# Patient Record
Sex: FEMALE | Race: BLACK OR AFRICAN AMERICAN | URBAN - METROPOLITAN AREA
[De-identification: names, ages, dates, MRNs, and addresses within clinical notes are randomized per-mention and may not be internally consistent; named-entity substitution may affect disease eponyms.]

---

## 2020-10-05 ENCOUNTER — TELEPHONE ENCOUNTER (OUTPATIENT)
Dept: URBAN - METROPOLITAN AREA CLINIC 92 | Facility: CLINIC | Age: 65
End: 2020-10-05

## 2020-10-05 ENCOUNTER — OFFICE VISIT (OUTPATIENT)
Dept: URBAN - METROPOLITAN AREA TELEHEALTH 2 | Facility: TELEHEALTH | Age: 65
End: 2020-10-05

## 2020-10-05 RX ORDER — METOPROLOL SUCCINATE 100 MG/1
TABLET, EXTENDED RELEASE ORAL
Qty: 0 | Refills: 0 | Status: ACTIVE | COMMUNITY
Start: 1900-01-01 | End: 1900-01-01

## 2021-05-27 ENCOUNTER — WEB ENCOUNTER (OUTPATIENT)
Dept: URBAN - METROPOLITAN AREA CLINIC 84 | Facility: CLINIC | Age: 66
End: 2021-05-27

## 2021-05-27 ENCOUNTER — OFFICE VISIT (OUTPATIENT)
Dept: URBAN - METROPOLITAN AREA CLINIC 84 | Facility: CLINIC | Age: 66
End: 2021-05-27
Payer: COMMERCIAL

## 2021-05-27 DIAGNOSIS — K21.9 GERD: ICD-10-CM

## 2021-05-27 DIAGNOSIS — R18.8 OTHER ASCITES: ICD-10-CM

## 2021-05-27 DIAGNOSIS — K59.04 CHRONIC IDIOPATHIC CONSTIPATION: ICD-10-CM

## 2021-05-27 DIAGNOSIS — K74.69 OTHER CIRRHOSIS OF LIVER: ICD-10-CM

## 2021-05-27 PROCEDURE — 99213 OFFICE O/P EST LOW 20 MIN: CPT | Performed by: INTERNAL MEDICINE

## 2021-05-27 RX ORDER — FLUTICASONE PROPIONATE 50 UG/1
1 SPRAY IN EACH NOSTRIL SPRAY, METERED NASAL ONCE A DAY
Status: ACTIVE | COMMUNITY

## 2021-05-27 RX ORDER — CHOLECALCIFEROL (VITAMIN D3) 1250 MCG
1 CAPSULE CAPSULE ORAL
Status: ACTIVE | COMMUNITY

## 2021-05-27 RX ORDER — OMEPRAZOLE 40 MG/1
1 CAPSULE 30 MINUTES BEFORE MORNING MEAL CAPSULE, DELAYED RELEASE ORAL ONCE A DAY
Qty: 30 | Refills: 5 | OUTPATIENT
Start: 2021-05-27

## 2021-05-27 RX ORDER — METFORMIN HYDROCHLORIDE 500 MG/1
1 TABLET WITH A MEAL TABLET, FILM COATED ORAL ONCE A DAY
Status: ACTIVE | COMMUNITY

## 2021-05-27 RX ORDER — GABAPENTIN 300 MG/1
1 CAPSULE CAPSULE ORAL ONCE A DAY
Status: ACTIVE | COMMUNITY

## 2021-05-27 RX ORDER — VITAMIN A 2400 MCG
1 TABLET CAPSULE ORAL ONCE A DAY
Status: ACTIVE | COMMUNITY

## 2021-05-27 RX ORDER — HYDROCHLOROTHIAZIDE 50 MG/1
1 TABLET IN THE MORNING TABLET ORAL ONCE A DAY
Status: ACTIVE | COMMUNITY

## 2021-05-27 RX ORDER — LACTULOSE 10 G/15ML
30 ML SOLUTION ORAL
Qty: 1800 MILLILITER | Refills: 5 | OUTPATIENT
Start: 2021-05-27 | End: 2021-11-22

## 2021-05-27 RX ORDER — METOPROLOL SUCCINATE 100 MG/1
TABLET, EXTENDED RELEASE ORAL
Qty: 0 | Refills: 0 | Status: ACTIVE | COMMUNITY
Start: 1900-01-01

## 2021-05-27 NOTE — HPI-TODAY'S VISIT:
Patient last seen 4/2019.  Triple Phase CT Scan and labs thenw ere normal with undetectable HCV RNA.  Ther ehas been no change in her PMhx/PSHx.  She was in [place] over the past year during COVID.  She ahs a lot of abdominal bloat.  She has been having constipation.  She ahs firm stool with a lot of strain and incomeplte evacuation.  She is on iron.  She is not on narcotics.  SHe is on Gabapentin.  She denies LG bleed or melena.  She used some Lactulose with some relief.  She also used some OTC MOM.  She denies abdominal pain except some discomfrot from her bloat.  She has occasional LE edema.  She ahs GERD with regurgitation of her food.  This increases when she is supine.  She dneies N/V.  She does have occasional dysphagia.  She is due for repeat colonoscopy due to her history of colon polyps.  She has documented cirrhosis.  She ahs not ahd a recent US.  Labs from 5/20 show normal LFT's and CBC

## 2021-05-28 ENCOUNTER — TELEPHONE ENCOUNTER (OUTPATIENT)
Dept: URBAN - METROPOLITAN AREA CLINIC 96 | Facility: CLINIC | Age: 66
End: 2021-05-28

## 2021-06-03 ENCOUNTER — LAB OUTSIDE AN ENCOUNTER (OUTPATIENT)
Dept: URBAN - METROPOLITAN AREA CLINIC 84 | Facility: CLINIC | Age: 66
End: 2021-06-03

## 2021-06-06 PROBLEM — 19943007: Status: ACTIVE | Noted: 2021-05-27

## 2021-06-06 PROBLEM — 235595009 GASTROESOPHAGEAL REFLUX DISEASE: Status: ACTIVE | Noted: 2021-05-27

## 2021-06-06 PROBLEM — 82934008 CHRONIC IDIOPATHIC CONSTIPATION: Status: ACTIVE | Noted: 2021-05-27

## 2021-06-06 PROBLEM — 389026000: Status: ACTIVE | Noted: 2021-05-27

## 2021-06-24 ENCOUNTER — TELEPHONE ENCOUNTER (OUTPATIENT)
Dept: URBAN - METROPOLITAN AREA CLINIC 84 | Facility: CLINIC | Age: 66
End: 2021-06-24

## 2021-12-27 ENCOUNTER — OFFICE VISIT (OUTPATIENT)
Dept: URBAN - METROPOLITAN AREA CLINIC 109 | Facility: CLINIC | Age: 66
End: 2021-12-27
Payer: COMMERCIAL

## 2021-12-27 ENCOUNTER — DASHBOARD ENCOUNTERS (OUTPATIENT)
Age: 66
End: 2021-12-27

## 2021-12-27 ENCOUNTER — LAB OUTSIDE AN ENCOUNTER (OUTPATIENT)
Dept: URBAN - METROPOLITAN AREA CLINIC 109 | Facility: CLINIC | Age: 66
End: 2021-12-27

## 2021-12-27 DIAGNOSIS — Z79.4 LONG TERM (CURRENT) USE OF INSULIN: ICD-10-CM

## 2021-12-27 DIAGNOSIS — Z86.19 HISTORY OF HEPATITIS C: ICD-10-CM

## 2021-12-27 DIAGNOSIS — Z86.010 HISTORY OF COLON POLYPS: ICD-10-CM

## 2021-12-27 DIAGNOSIS — R10.11 RUQ PAIN: ICD-10-CM

## 2021-12-27 DIAGNOSIS — K74.60 CIRRHOSIS OF LIVER WITHOUT ASCITES, UNSPECIFIED HEPATIC CIRRHOSIS TYPE: ICD-10-CM

## 2021-12-27 DIAGNOSIS — K21.9 GASTROESOPHAGEAL REFLUX DISEASE, UNSPECIFIED WHETHER ESOPHAGITIS PRESENT: ICD-10-CM

## 2021-12-27 DIAGNOSIS — E11.9 TYPE 2 DIABETES MELLITUS WITHOUT COMPLICATIONS: ICD-10-CM

## 2021-12-27 PROBLEM — 313436004: Status: ACTIVE | Noted: 2021-12-27

## 2021-12-27 PROBLEM — 19943007: Status: ACTIVE | Noted: 2021-12-27

## 2021-12-27 PROBLEM — 710815001: Status: ACTIVE | Noted: 2021-12-27

## 2021-12-27 PROBLEM — 235595009: Status: ACTIVE | Noted: 2021-12-27

## 2021-12-27 PROCEDURE — 99213 OFFICE O/P EST LOW 20 MIN: CPT | Performed by: INTERNAL MEDICINE

## 2021-12-27 RX ORDER — VITAMIN A 2400 MCG
1 TABLET CAPSULE ORAL ONCE A DAY
Status: ACTIVE | COMMUNITY

## 2021-12-27 RX ORDER — CHOLECALCIFEROL (VITAMIN D3) 1250 MCG
1 CAPSULE CAPSULE ORAL
Status: ACTIVE | COMMUNITY

## 2021-12-27 RX ORDER — GABAPENTIN 300 MG/1
1 CAPSULE CAPSULE ORAL ONCE A DAY
Status: DISCONTINUED | COMMUNITY

## 2021-12-27 RX ORDER — OMEPRAZOLE 40 MG/1
1 CAPSULE 30 MINUTES BEFORE MORNING MEAL CAPSULE, DELAYED RELEASE ORAL ONCE A DAY
Qty: 30 | Refills: 5 | Status: ACTIVE | COMMUNITY
Start: 2021-05-27

## 2021-12-27 RX ORDER — METOPROLOL SUCCINATE 100 MG/1
TABLET, EXTENDED RELEASE ORAL
Qty: 0 | Refills: 0 | Status: ACTIVE | COMMUNITY
Start: 1900-01-01

## 2021-12-27 RX ORDER — OMEPRAZOLE 40 MG/1
1 CAPSULE 30 MINUTES BEFORE MORNING MEAL CAPSULE, DELAYED RELEASE ORAL ONCE A DAY
Qty: 90 CAPSULE | Refills: 3
Start: 2021-05-27

## 2021-12-27 RX ORDER — METFORMIN HYDROCHLORIDE 500 MG/1
1 TABLET WITH A MEAL TABLET, FILM COATED ORAL ONCE A DAY
Status: ACTIVE | COMMUNITY

## 2021-12-27 RX ORDER — HYDROCHLOROTHIAZIDE 50 MG/1
1 TABLET IN THE MORNING TABLET ORAL ONCE A DAY
Status: ACTIVE | COMMUNITY

## 2021-12-27 RX ORDER — FLUTICASONE PROPIONATE 50 UG/1
1 SPRAY IN EACH NOSTRIL SPRAY, METERED NASAL ONCE A DAY
Status: ACTIVE | COMMUNITY
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
## 2022-01-07 ENCOUNTER — TELEPHONE ENCOUNTER (OUTPATIENT)
Dept: URBAN - METROPOLITAN AREA CLINIC 6 | Facility: CLINIC | Age: 67
End: 2022-01-07

## 2022-01-11 ENCOUNTER — HOSPITAL ENCOUNTER (EMERGENCY)
Dept: HOSPITAL 5 - ED | Age: 67
LOS: 1 days | Discharge: HOME | End: 2022-01-12
Payer: MEDICARE

## 2022-01-11 VITALS — DIASTOLIC BLOOD PRESSURE: 68 MMHG | SYSTOLIC BLOOD PRESSURE: 104 MMHG

## 2022-01-11 DIAGNOSIS — N39.0: Primary | ICD-10-CM

## 2022-01-11 DIAGNOSIS — Z88.5: ICD-10-CM

## 2022-01-11 DIAGNOSIS — R10.9: ICD-10-CM

## 2022-01-11 LAB
ALBUMIN SERPL-MCNC: 4.3 G/DL (ref 3.9–5)
ALT SERPL-CCNC: 25 UNITS/L (ref 7–56)
BACTERIA #/AREA URNS HPF: (no result) /HPF
BASOPHILS # (AUTO): 0.1 K/MM3 (ref 0–0.1)
BASOPHILS NFR BLD AUTO: 0.8 % (ref 0–1.8)
BILIRUB UR QL STRIP: (no result)
BLOOD UR QL VISUAL: (no result)
BUN SERPL-MCNC: 11 MG/DL (ref 7–17)
BUN/CREAT SERPL: 18 %
CALCIUM SERPL-MCNC: 10.1 MG/DL (ref 8.4–10.2)
EOSINOPHIL # BLD AUTO: 0.2 K/MM3 (ref 0–0.4)
EOSINOPHIL NFR BLD AUTO: 2.9 % (ref 0–4.3)
HCT VFR BLD CALC: 38.8 % (ref 30.3–42.9)
HEMOLYSIS INDEX: 23
HGB BLD-MCNC: 12.1 GM/DL (ref 10.1–14.3)
LYMPHOCYTES # BLD AUTO: 2.4 K/MM3 (ref 1.2–5.4)
LYMPHOCYTES NFR BLD AUTO: 33.7 % (ref 13.4–35)
MCHC RBC AUTO-ENTMCNC: 31 % (ref 30–34)
MCV RBC AUTO: 86 FL (ref 79–97)
MONOCYTES # (AUTO): 0.6 K/MM3 (ref 0–0.8)
MONOCYTES % (AUTO): 8.2 % (ref 0–7.3)
MUCOUS THREADS #/AREA URNS HPF: (no result) /HPF
PH UR STRIP: 5 [PH] (ref 5–7)
PLATELET # BLD: 243 K/MM3 (ref 140–440)
RBC # BLD AUTO: 4.51 M/MM3 (ref 3.65–5.03)
RBC #/AREA URNS HPF: 11 /HPF (ref 0–6)
UROBILINOGEN UR-MCNC: 2 MG/DL (ref ?–2)
WBC #/AREA URNS HPF: > 182 /HPF (ref 0–6)

## 2022-01-11 PROCEDURE — 83690 ASSAY OF LIPASE: CPT

## 2022-01-11 PROCEDURE — 85025 COMPLETE CBC W/AUTO DIFF WBC: CPT

## 2022-01-11 PROCEDURE — 96375 TX/PRO/DX INJ NEW DRUG ADDON: CPT

## 2022-01-11 PROCEDURE — 81001 URINALYSIS AUTO W/SCOPE: CPT

## 2022-01-11 PROCEDURE — 99284 EMERGENCY DEPT VISIT MOD MDM: CPT

## 2022-01-11 PROCEDURE — 80053 COMPREHEN METABOLIC PANEL: CPT

## 2022-01-11 PROCEDURE — 36415 COLL VENOUS BLD VENIPUNCTURE: CPT

## 2022-01-11 PROCEDURE — 74177 CT ABD & PELVIS W/CONTRAST: CPT

## 2022-01-11 PROCEDURE — 96365 THER/PROPH/DIAG IV INF INIT: CPT

## 2022-01-11 NOTE — EVENT NOTE
ED Screening Note


ED Screening Note: 








pt presents for abd pain and back pain for one month


states she has been taking gabapentin but pain is not improving


states she saw Dr lauri BUTLER and has an outpatient CT scheduled but states she 

could no longer take the pain


she has been having intermittent n/v/d








This initial assessment/diagnostic orders/clinical plan/treatment(s) is/are 

subject to change based on patients health status, clinical progression and re-

assessment by fellow clinical providers in the ED. Further treatment and workup 

at subsequent clinical providers discretion. Patient/guardian urged not to elope

from the ED as their condition may be serious if not clinically assessed and 

managed. 





Initial orders include: 


labs, urine, ct

## 2022-01-11 NOTE — CAT SCAN REPORT
CT ABDOMEN AND PELVIS WITH CONTRAST



INDICATION / CLINICAL INFORMATION: Pt complains of abdominal and lower back pain..



TECHNIQUE: Axial CT images were obtained through the abdomen and pelvis after IV contrast.  All CT sc
ans at this location are performed using CT dose reduction for ALARA by means of automated exposure c
ontrol. 



COMPARISON: None available.



FINDINGS:



LOWER CHEST: No significant abnormality.

LIVER: No significant abnormality.

GALLBLADDER: No significant abnormality.  

PANCREAS: No significant abnormality.

SPLEEN: No significant abnormality.

ADRENALS: No significant abnormality.

RIGHT KIDNEY / URETER: 3.2 cm cyst medial interpolar region right kidney.

LEFT KIDNEY / URETER: No significant abnormality.



STOMACH / SMALL BOWEL: No significant abnormality. 

COLON: No significant abnormality. 

APPENDIX: No significant abnormality.  

PERITONEUM: No free fluid, free air or organized collection.

LYMPH NODES: Numerous enlarged lymph nodes within the upper abdomen retroperitoneal and small bowel m
esenteric region with conglomerate mass extending from the retroperitoneal region in the ezequiel hepati
s measuring approximately 7.5 x 3.8 cm in axial dimension. There are numerous other enlarged retroper
itoneal lymph nodes measuring upwards of 2 to 3 cm in short axis diameter throughout the upper retrop
eritoneum and aortocaval regions.

AORTA / ARTERIES/ VEINS: Mild atherosclerotic calcification without acute abnormality. 



URINARY BLADDER: No significant abnormality.

REPRODUCTIVE ORGANS: No significant abnormality.



ADDITIONAL FINDINGS: None.



SKELETAL SYSTEM: No significant abnormality.



IMPRESSION:

1.  Widespread adenopathy of the retroperitoneum as above extending into the ezequiel hepatis region and
 small bowel mesentery, suspicious for malignant lymphoproliferative disorder. Further evaluation wit
h appropriate laboratory testing and soft tissue sampling is recommended.

2.  No other acute abnormality within the abdomen or pelvis.



Signer Name: Delfino Mccray MD 

Signed: 1/11/2022 10:22 PM

Workstation Name: [id number]

## 2022-01-12 NOTE — EMERGENCY DEPARTMENT REPORT
ED General Adult HPI





- General


Chief complaint: Upper Respiratory Infection


Stated complaint: STOMACH/ BACK PAIN


Time Seen by Provider: 01/11/22 23:43


Source: patient


Mode of arrival: Ambulatory


Limitations: No Limitations





- History of Present Illness


Initial comments: 


pt presents for abd pain and back pain for one month, states she has been taking

gabapentin but pain is not improving, states she saw Dr prabhjot BUTLER and has an 

outpatient CT scheduled but states she could no longer take the pain, she has 

been having intermittent n/v/d, pt it tolerating po intake at this time








Severity scale (0 -10): 6





- Related Data


                                  Previous Rx's











 Medication  Instructions  Recorded  Last Taken  Type


 


Ciprofloxacin HCl 500 mg PO BID 7 Days #14 01/12/22 Unknown Rx


 


metroNIDAZOLE [Flagyl] 500 mg PO Q8HR 7 Days #21 tablet 01/12/22 Unknown Rx


 


traMADoL [Ultram] 50 mg PO Q6HR PRN #12 tablet 01/12/22 Unknown Rx











                                    Allergies











Allergy/AdvReac Type Severity Reaction Status Date / Time


 


codeine Allergy  Itching Verified 01/11/22 08:20














ED Review of Systems


ROS: 


Stated complaint: STOMACH/ BACK PAIN


Other details as noted in HPI





Constitutional: denies: chills, fever


Eyes: denies: eye pain, eye discharge, vision change


ENT: denies: ear pain, throat pain


Respiratory: denies: cough, shortness of breath, wheezing


Cardiovascular: denies: chest pain, palpitations


Endocrine: no symptoms reported


Gastrointestinal: abdominal pain, nausea, vomiting, diarrhea.  denies: 

constipation, hematemesis, melena, hematochezia


Genitourinary: urgency, dysuria, discharge.  denies: hematuria


Musculoskeletal: denies: back pain, joint swelling, arthralgia


Skin: denies: rash, lesions


Neurological: denies: headache, weakness, paresthesias, vertigo


Psychiatric: denies: anxiety, depression


Hematological/Lymphatic: denies: easy bleeding, easy bruising





ED Past Medical Hx





- Medications


Home Medications: 


                                Home Medications











 Medication  Instructions  Recorded  Confirmed  Last Taken  Type


 


Ciprofloxacin HCl 500 mg PO BID 7 Days #14 01/12/22  Unknown Rx


 


metroNIDAZOLE [Flagyl] 500 mg PO Q8HR 7 Days #21 tablet 01/12/22  Unknown Rx


 


traMADoL [Ultram] 50 mg PO Q6HR PRN #12 tablet 01/12/22  Unknown Rx














ED Physical Exam





- General


Limitations: No Limitations


General appearance: alert, in no apparent distress





- Head


Head exam: Present: atraumatic, normocephalic





- Eye


Eye exam: Present: normal appearance, EOMI


Pupils: Present: normal accommodation





- ENT


ENT exam: Present: mucous membranes moist





- Neck


Neck exam: Present: normal inspection, full ROM.  Absent: tenderness





- Respiratory


Respiratory exam: Present: normal lung sounds bilaterally.  Absent: respiratory 

distress, wheezes, stridor, chest wall tenderness





- Cardiovascular


Cardiovascular Exam: Present: regular rate, normal rhythm, normal heart sounds. 

 Absent: systolic murmur, diastolic murmur, rubs, gallop





- GI/Abdominal


GI/Abdominal exam: Present: soft, normal bowel sounds.  Absent: distended, te

nderness, guarding, rebound, rigid, bruit, hernia





- Rectal


Rectal exam: Present: deferred





- Extremities Exam


Extremities exam: Present: normal inspection, full ROM, normal capillary refill.

  Absent: tenderness





- Back Exam


Back exam: Present: normal inspection, full ROM.  Absent: CVA tenderness (R), 

CVA tenderness (L)





- Neurological Exam


Neurological exam: Present: alert, oriented X3, CN II-XII intact





- Expanded Neurological Exam


  ** Expanded


Patient oriented to: Present: person, place, time


Speech: Present: fluid speech


Motor strength exam: RUE: 5, LUE: 5, RLE: 5, LLE: 5


Best Eye Response (Shay): (4) open spontaneously


Best Motor Response (Shay): (6) obeys commands


Best Verbal Response (Mathis): (5) oriented


Shay Total: 15





- Psychiatric


Psychiatric exam: Present: normal affect, normal mood





- Skin


Skin exam: Present: warm, dry, intact, normal color.  Absent: rash





ED Course





                                   Vital Signs











  01/11/22





  08:20


 


Temperature 98.8 F


 


Pulse Rate 96 H


 


Respiratory 16





Rate 


 


Blood Pressure 104/68





[Right] 


 


O2 Sat by Pulse 97





Oximetry 














ED Medical Decision Making





- Lab Data


Result diagrams: 


                                 01/11/22 18:48





                                 01/11/22 18:48








Labs











  01/11/22 01/11/22 01/11/22





  18:48 18:48 Unknown


 


WBC  7.1  


 


RBC  4.51  


 


Hgb  12.1  


 


Hct  38.8  


 


MCV  86  


 


MCH  27 L  


 


MCHC  31  


 


RDW  14.0  


 


Plt Count  243  


 


Lymph % (Auto)  33.7  


 


Mono % (Auto)  8.2 H  


 


Eos % (Auto)  2.9  


 


Baso % (Auto)  0.8  


 


Lymph # (Auto)  2.4  


 


Mono # (Auto)  0.6  


 


Eos # (Auto)  0.2  


 


Baso # (Auto)  0.1  


 


Seg Neutrophils %  54.4  


 


Seg Neutrophils #  3.9  


 


Sodium   140 


 


Potassium   3.9 


 


Chloride   103.4 


 


Carbon Dioxide   24 


 


Anion Gap   17 


 


BUN   11 


 


Creatinine   0.6 


 


Estimated GFR   > 60 


 


BUN/Creatinine Ratio   18 


 


Glucose   128 H 


 


Calcium   10.1 


 


Total Bilirubin   0.20 


 


AST   25 


 


ALT   25 


 


Alkaline Phosphatase   81 


 


Total Protein   8.3 H 


 


Albumin   4.3 


 


Albumin/Globulin Ratio   1.1 


 


Lipase   28 


 


Urine Color    Yellow


 


Urine Turbidity    Cloudy


 


Urine pH    5.0


 


Ur Specific Gravity    1.018


 


Urine Protein    30 mg/dl


 


Urine Glucose (UA)    Neg


 


Urine Ketones    Neg


 


Urine Blood    Neg


 


Urine Nitrite    Pos


 


Urine Bilirubin    Neg


 


Urine Urobilinogen    2.0


 


Ur Leukocyte Esterase    Lg


 


Urine WBC (Auto)    > 182.0 H


 


Urine RBC (Auto)    11.0


 


U Epithel Cells (Auto)    17.0 H


 


Urine Bacteria (Auto)    3+


 


Urine WBC Clumps    2+


 


Urine Mucus    2+


 


Urine Yeast (Budding)    Few














- Radiology Data


Radiology results: report reviewed, image reviewed





 


CT ABDOMEN AND PELVIS WITH CONTRAST  


 


 INDICATION / CLINICAL INFORMATION: Pt complains of abdominal and lower back 

pain..  


 


 TECHNIQUE: Axial CT images were obtained through the abdomen and pelvis after 

IV contrast.  All CT 


scans at this location are performed using CT dose reduction for ALARA by means 

of automated 


exposure control.   


 


 COMPARISON: None available.  


 


 FINDINGS:  


 


 LOWER CHEST: No significant abnormality.  


 LIVER: No significant abnormality.  


 GALLBLADDER: No significant abnormality.    


 PANCREAS: No significant abnormality.  


 SPLEEN: No significant abnormality.  


 ADRENALS: No significant abnormality.  


 RIGHT KIDNEY / URETER: 3.2 cm cyst medial interpolar region right kidney.  


 LEFT KIDNEY / URETER: No significant abnormality.  


 


 STOMACH / SMALL BOWEL: No significant abnormality.   


 COLON: No significant abnormality.   


 APPENDIX: No significant abnormality.    


 PERITONEUM: No free fluid, free air or organized collection.  


 LYMPH NODES: Numerous enlarged lymph nodes within the upper abdomen 

retroperitoneal and small bowel


mesenteric region with conglomerate mass extending from the retroperitoneal 

region in the ezequiel 


hepatis measuring approximately 7.5 x 3.8 cm in axial dimension. There are 

numerous other enlarged 


retroperitoneal lymph nodes measuring upwards of 2 to 3 cm in short axis 

diameter throughout the 


upper retroperitoneum and aortocaval regions.  


 AORTA / ARTERIES/ VEINS: Mild atherosclerotic calcification without acute 

abnormality.   


 


 URINARY BLADDER: No significant abnormality.  


 REPRODUCTIVE ORGANS: No significant abnormality.  


 


 ADDITIONAL FINDINGS: None.  


 


 SKELETAL SYSTEM: No significant abnormality.  


 


 IMPRESSION:  


 1.  Widespread adenopathy of the retroperitoneum as above extending into the 

ezequiel hepatis region 


and small bowel mesentery, suspicious for malignant lymphoproliferative 

disorder. Further evaluation


with appropriate laboratory testing and soft tissue sampling is recommended.  


 2.  No other acute abnormality within the abdomen or pelvis.  


 


 Signer Name: Delfino Mccray MD   


 Signed: 1/11/2022 10:22 PM  


 Workstation Name: [id number]   


 








- Medical Decision Making


CT abdomen pelvis broad spread adenopathy concerning for malignancy, patient is 

currently being followed and worked up by GI for same.  Follow-up with Dr. Prabhjot BUTLER in 1 to 2 days.  UA noted for leukocytes WBCs moderate yeast patient treated

 for UTI CT not significant for renal stones or gallstones.  Patient treated 

with antibiotics and IV fluids in ED patient advises feeling much better.  

Patient will follow-up with GI in 1 to 2 days.  Will be DC'd home with 

prescriptions.  Pain is currently improved patient continues to tolerate p.o. 

intake at this time without nausea vomiting.  Patient will be DC'd in stable 

condition at this time.  Patient declines smoking cessation at this time .


Critical care attestation.: 


If time is entered above; I have spent that time in minutes in the direct care 

of this critically ill patient, excluding procedure time.








ED Disposition


Clinical Impression: 


UTI (urinary tract infection)


Qualifiers:


 Urinary tract infection type: acute cystitis Hematuria presence: without 

hematuria Qualified Code(s): N30.00 - Acute cystitis without hematuria





Abdominal pain


Qualifiers:


 Abdominal location: generalized Qualified Code(s): R10.84 - Generalized 

abdominal pain





Disposition: 01 HOME / SELF CARE / HOMELESS


Is pt being admited?: No


Does the pt Need Aspirin: No


Condition: Stable


Instructions:  Abdominal Pain, Adult, Easy-to-Read, Urinary Tract Infection, 

Adult, Easy-to-Read


Additional Instructions: 


Follow-up with [facility] Dr. Marrufo call tomorrow to confirm 

upcoming appointment.  Follow-up with your primary care doctor in 2 to 3 days.  

Return to emergency department should symptoms worsen or unable to tolerate 

intake by mouth.


Prescriptions: 


Ciprofloxacin HCl 500 mg PO BID 7 Days #14


metroNIDAZOLE [Flagyl] 500 mg PO Q8HR 7 Days #21 tablet


traMADoL [Ultram] 50 mg PO Q6HR PRN #12 tablet


 PRN Reason: Pain


Referrals: 


JAXSON MARRUFO MD [Staff Physician] - 3-5 Days


SALVATORE WELLER MD [Staff Physician] - 3-5 Days


Forms:  Work/School Release Form(ED)


Time of Disposition: 01:49

## 2022-01-14 ENCOUNTER — TELEPHONE ENCOUNTER (OUTPATIENT)
Dept: URBAN - METROPOLITAN AREA CLINIC 92 | Facility: CLINIC | Age: 67
End: 2022-01-14

## 2022-01-18 ENCOUNTER — TELEPHONE ENCOUNTER (OUTPATIENT)
Dept: URBAN - METROPOLITAN AREA CLINIC 86 | Facility: CLINIC | Age: 67
End: 2022-01-18

## 2022-02-01 ENCOUNTER — TELEPHONE ENCOUNTER (OUTPATIENT)
Dept: URBAN - METROPOLITAN AREA CLINIC 109 | Facility: CLINIC | Age: 67
End: 2022-02-01

## 2022-02-01 ENCOUNTER — LAB OUTSIDE AN ENCOUNTER (OUTPATIENT)
Dept: URBAN - METROPOLITAN AREA CLINIC 92 | Facility: CLINIC | Age: 67
End: 2022-02-01

## 2022-02-01 ENCOUNTER — TELEPHONE ENCOUNTER (OUTPATIENT)
Dept: URBAN - METROPOLITAN AREA CLINIC 92 | Facility: CLINIC | Age: 67
End: 2022-02-01

## 2022-02-04 ENCOUNTER — HOSPITAL ENCOUNTER (EMERGENCY)
Dept: HOSPITAL 5 - ED | Age: 67
LOS: 1 days | Discharge: HOME | End: 2022-02-05
Payer: MEDICAID

## 2022-02-04 DIAGNOSIS — E11.9: ICD-10-CM

## 2022-02-04 DIAGNOSIS — K68.9: Primary | ICD-10-CM

## 2022-02-04 DIAGNOSIS — Z88.5: ICD-10-CM

## 2022-02-04 DIAGNOSIS — Z79.899: ICD-10-CM

## 2022-02-04 DIAGNOSIS — R11.2: ICD-10-CM

## 2022-02-04 DIAGNOSIS — I10: ICD-10-CM

## 2022-02-04 LAB
ALBUMIN SERPL-MCNC: 4.6 G/DL (ref 3.9–5)
ALT SERPL-CCNC: 15 UNITS/L (ref 7–56)
APTT BLD: 27.5 SEC. (ref 24.2–36.6)
BASOPHILS # (AUTO): 0 K/MM3 (ref 0–0.1)
BASOPHILS NFR BLD AUTO: 0.1 % (ref 0–1.8)
BILIRUB DIRECT SERPL-MCNC: < 0.2 MG/DL (ref 0–0.2)
BILIRUB UR QL STRIP: (no result)
BLOOD UR QL VISUAL: (no result)
BUN SERPL-MCNC: 6 MG/DL (ref 7–17)
BUN/CREAT SERPL: 9 %
CALCIUM SERPL-MCNC: 10.5 MG/DL (ref 8.4–10.2)
EOSINOPHIL # BLD AUTO: 0.1 K/MM3 (ref 0–0.4)
EOSINOPHIL NFR BLD AUTO: 1.8 % (ref 0–4.3)
HCT VFR BLD CALC: 42.8 % (ref 30.3–42.9)
HEMOLYSIS INDEX: 6
HGB BLD-MCNC: 13.2 GM/DL (ref 10.1–14.3)
INR PPP: 1.01 (ref 0.87–1.13)
LYMPHOCYTES # BLD AUTO: 1.7 K/MM3 (ref 1.2–5.4)
LYMPHOCYTES NFR BLD AUTO: 23.6 % (ref 13.4–35)
MCHC RBC AUTO-ENTMCNC: 31 % (ref 30–34)
MCV RBC AUTO: 85 FL (ref 79–97)
MONOCYTES # (AUTO): 0.5 K/MM3 (ref 0–0.8)
MONOCYTES % (AUTO): 6.5 % (ref 0–7.3)
PH UR STRIP: 6 [PH] (ref 5–7)
PLATELET # BLD: 283 K/MM3 (ref 140–440)
PROT UR STRIP-MCNC: >500 MG/DL
RBC # BLD AUTO: 5.04 M/MM3 (ref 3.65–5.03)
RBC #/AREA URNS HPF: < 1 /HPF (ref 0–6)
UROBILINOGEN UR-MCNC: < 2 MG/DL (ref ?–2)
WBC #/AREA URNS HPF: < 1 /HPF (ref 0–6)

## 2022-02-04 PROCEDURE — 85025 COMPLETE CBC W/AUTO DIFF WBC: CPT

## 2022-02-04 PROCEDURE — 71045 X-RAY EXAM CHEST 1 VIEW: CPT

## 2022-02-04 PROCEDURE — 83690 ASSAY OF LIPASE: CPT

## 2022-02-04 PROCEDURE — 85730 THROMBOPLASTIN TIME PARTIAL: CPT

## 2022-02-04 PROCEDURE — 84484 ASSAY OF TROPONIN QUANT: CPT

## 2022-02-04 PROCEDURE — 99285 EMERGENCY DEPT VISIT HI MDM: CPT

## 2022-02-04 PROCEDURE — 80076 HEPATIC FUNCTION PANEL: CPT

## 2022-02-04 PROCEDURE — 93010 ELECTROCARDIOGRAM REPORT: CPT

## 2022-02-04 PROCEDURE — 93005 ELECTROCARDIOGRAM TRACING: CPT

## 2022-02-04 PROCEDURE — 36415 COLL VENOUS BLD VENIPUNCTURE: CPT

## 2022-02-04 PROCEDURE — 96374 THER/PROPH/DIAG INJ IV PUSH: CPT

## 2022-02-04 PROCEDURE — 83880 ASSAY OF NATRIURETIC PEPTIDE: CPT

## 2022-02-04 PROCEDURE — 82271 OCCULT BLOOD OTHER SOURCES: CPT

## 2022-02-04 PROCEDURE — 74177 CT ABD & PELVIS W/CONTRAST: CPT

## 2022-02-04 PROCEDURE — 96375 TX/PRO/DX INJ NEW DRUG ADDON: CPT

## 2022-02-04 PROCEDURE — 80048 BASIC METABOLIC PNL TOTAL CA: CPT

## 2022-02-04 PROCEDURE — 83735 ASSAY OF MAGNESIUM: CPT

## 2022-02-04 PROCEDURE — 81001 URINALYSIS AUTO W/SCOPE: CPT

## 2022-02-04 PROCEDURE — 96361 HYDRATE IV INFUSION ADD-ON: CPT

## 2022-02-04 PROCEDURE — 85610 PROTHROMBIN TIME: CPT

## 2022-02-04 NOTE — XRAY REPORT
XR chest 1V ap



INDICATION / CLINICAL INFORMATION:

n.v



COMPARISON: 

None available.



FINDINGS:



SUPPORT DEVICES: None.



HEART / MEDIASTINUM: No significant abnormality. 



LUNGS / PLEURA: Lungs are clear.  Costophrenic sulci are sharp. No pneumothorax.



ADDITIONAL FINDINGS: No significant additional findings.



IMPRESSION:

1. No acute findings.



Signer Name: Raúl Chowdhury MD 

Signed: 2/4/2022 7:48 PM

Workstation Name: [id number]

## 2022-02-04 NOTE — EMERGENCY DEPARTMENT REPORT
HPI





- General


Chief Complaint: Abdominal Pain


Time Seen by Provider: 02/04/22 18:51





- HPI


HPI: 





66-year-old female with history of hypertension, diabetes mellitus, and recently

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

nt work-up presents complaining of generalized abdominal pain and nausea 

vomiting since last night.  The patient reports that she has had generalized 

abdominal pain which is constant since November 2020.  She says that last night 

she developed nausea and vomiting and threw up her prescribed morphine.  She 

also reports that she has had some diarrhea which she describes as watery and 

black.  She does say she took Pepto-Bismol.   She says she is currently waiting 

to receive authorization for biopsy of her retroperitoneal mass.  There are no 

known aggravating or alleviating factors.  She has not tried anything for her 

symptoms.  She denies any associated fever/chills, headache, vision change, neck

pain, chest pain, cough, back pain, dysuria, frequency, focal weakness, sensory 

changes, or any other complaints. 





ED Past Medical Hx





- Past Medical History


Previous Medical History?: Yes


Hx Hypertension: Yes


Hx Diabetes: Yes


Additional medical history: Retroperitoneal mass





- Medications


Home Medications: 


                                Home Medications











 Medication  Instructions  Recorded  Confirmed  Last Taken  Type


 


Ciprofloxacin HCl 500 mg PO BID 7 Days #14 01/12/22  Unknown Rx


 


metroNIDAZOLE [Flagyl] 500 mg PO Q8HR 7 Days #21 tablet 01/12/22  Unknown Rx


 


traMADoL [Ultram] 50 mg PO Q6HR PRN #12 tablet 01/12/22  Unknown Rx


 


Ondansetron [Zofran Odt] 4 mg PO Q8HR PRN #20 tab.rapdis 02/04/22  Unknown Rx














ED Review of Systems


ROS: 


Stated complaint: ABDOMINAL PAIN, VOMITING AND DIARRHEA X 1 WEEK


Other details as noted in HPI





Comment: All other systems reviewed and negative


Constitutional: denies: chills, fever


Eyes: denies: eye pain, vision change


ENT: denies: throat pain, congestion


Respiratory: denies: cough, shortness of breath


Cardiovascular: denies: chest pain, palpitations


Gastrointestinal: abdominal pain, nausea, vomiting, diarrhea


Genitourinary: denies: dysuria, frequency


Musculoskeletal: denies: back pain, arthralgia


Skin: denies: rash, lesions


Neurological: denies: headache, weakness, numbness


Hematological/Lymphatic: denies: easy bleeding





Physical Exam





- Physical Exam


Vital Signs: 


                                   Vital Signs











  02/04/22





  18:50


 


Temperature 98.2 F


 


Pulse Rate 91 H


 


Respiratory 181 H





Rate 


 


Blood Pressure 152/98





[Left] 


 


O2 Sat by Pulse 96





Oximetry 











Physical Exam: 





GENERAL: Well developed and well nourished.  In mild distress secondary to pain


HEAD: Normocephalic. No obvious signs of trauma. 


ENT: Dry mucous membranes.


EYES: Extraocular movements are intact. Pupils are equal round and reactive to 

light bilaterally


NECK: Supple. Full ROM is intact. Trachea is midline.


LUNGS: Nonlabored breathing. Equal chest rise bilaterally. Clear to auscultation

 bilaterally.


CARDIOVASCULAR: Regular rate and rhythm. No murmurs or rubs.


VASCULAR: Cap refill < 2 seconds.  1+ pitting edema bilaterally.


ABDOMEN: Abdomen is distended but soft.  There is tenderness to palpation of the

 left upper quadrant and left lower quadrant without guarding or rebound 

tenderness.


SKIN: Skin is warm and dry


NEURO: Patient is awake, alert, and oriented. CNs II-XII grossly intact. No 

focal deficits. Normal motor and sensory exam throughout. Normal speech.  


MUSCULOSKELETAL: No obvious deformities. No significant tenderness. Normal ROM 

throughout. 


BACK/SPINE: No midline tenderness or step-offs of the C/T/L spine.  Left CVA 

tenderness





ED Course


                                   Vital Signs











  02/04/22





  18:50


 


Temperature 98.2 F


 


Pulse Rate 91 H


 


Respiratory 181 H





Rate 


 


Blood Pressure 152/98





[Left] 


 


O2 Sat by Pulse 96





Oximetry 














ED Medical Decision Making





- Lab Data


Result diagrams: 


                                 02/04/22 19:27





                                 02/04/22 19:27





                                   Lab Results











  02/04/22 02/04/22 02/04/22 Range/Units





  19:27 19:27 19:27 


 


WBC  7.1    (4.5-11.0)  K/mm3


 


RBC  5.04 H    (3.65-5.03)  M/mm3


 


Hgb  13.2    (10.1-14.3)  gm/dl


 


Hct  42.8    (30.3-42.9)  %


 


MCV  85    (79-97)  fl


 


MCH  26 L    (28-32)  pg


 


MCHC  31    (30-34)  %


 


RDW  14.0    (13.2-15.2)  %


 


Plt Count  283    (140-440)  K/mm3


 


Lymph % (Auto)  23.6    (13.4-35.0)  %


 


Mono % (Auto)  6.5    (0.0-7.3)  %


 


Eos % (Auto)  1.8    (0.0-4.3)  %


 


Baso % (Auto)  0.1    (0.0-1.8)  %


 


Lymph # (Auto)  1.7    (1.2-5.4)  K/mm3


 


Mono # (Auto)  0.5    (0.0-0.8)  K/mm3


 


Eos # (Auto)  0.1    (0.0-0.4)  K/mm3


 


Baso # (Auto)  0.0    (0.0-0.1)  K/mm3


 


Seg Neutrophils %  68.0    (40.0-70.0)  %


 


Seg Neutrophils #  4.8    (1.8-7.7)  K/mm3


 


PT    14.4  (12.2-14.9)  Sec.


 


INR    1.01  (0.87-1.13)  


 


APTT    27.5  (24.2-36.6)  Sec.


 


Sodium   137   (137-145)  mmol/L


 


Potassium   3.9   (3.6-5.0)  mmol/L


 


Chloride   99.2   ()  mmol/L


 


Carbon Dioxide   23   (22-30)  mmol/L


 


Anion Gap   19   mmol/L


 


BUN   6 L   (7-17)  mg/dL


 


Creatinine   0.7   (0.6-1.2)  mg/dL


 


Estimated GFR   > 60   ml/min


 


BUN/Creatinine Ratio   9   %


 


Glucose   105 H   ()  mg/dL


 


Calcium   10.5 H   (8.4-10.2)  mg/dL


 


Magnesium     (1.7-2.3)  mg/dL


 


Total Bilirubin   0.50   (0.1-1.2)  mg/dL


 


Direct Bilirubin   < 0.2   (0-0.2)  mg/dL


 


Indirect Bilirubin   0.3   mg/dL


 


AST   34   (5-40)  units/L


 


ALT   15   (7-56)  units/L


 


Alkaline Phosphatase   70   ()  units/L


 


Troponin T     (0.00-0.029)  ng/mL


 


NT-Pro-B Natriuret Pep     (0-900)  pg/mL


 


Total Protein   9.5 H   (6.3-8.2)  g/dL


 


Albumin   4.6   (3.9-5)  g/dL


 


Albumin/Globulin Ratio   0.9   %


 


Lipase   17   (13-60)  units/L


 


Urine Color     (Yellow)  


 


Urine Turbidity     (Clear)  


 


Urine pH     (5.0-7.0)  


 


Ur Specific Gravity     (1.003-1.030)  


 


Urine Protein     (Negative)  mg/dL


 


Urine Glucose (UA)     (Negative)  mg/dL


 


Urine Ketones     (Negative)  mg/dL


 


Urine Blood     (Negative)  


 


Urine Nitrite     (Negative)  


 


Urine Bilirubin     (Negative)  


 


Urine Urobilinogen     (<2.0)  mg/dL


 


Ur Leukocyte Esterase     (Negative)  


 


Urine WBC (Auto)     (0.0-6.0)  /HPF


 


Urine RBC (Auto)     (0.0-6.0)  /HPF














  02/04/22 02/04/22 Range/Units





  19:27 20:14 


 


WBC    (4.5-11.0)  K/mm3


 


RBC    (3.65-5.03)  M/mm3


 


Hgb    (10.1-14.3)  gm/dl


 


Hct    (30.3-42.9)  %


 


MCV    (79-97)  fl


 


MCH    (28-32)  pg


 


MCHC    (30-34)  %


 


RDW    (13.2-15.2)  %


 


Plt Count    (140-440)  K/mm3


 


Lymph % (Auto)    (13.4-35.0)  %


 


Mono % (Auto)    (0.0-7.3)  %


 


Eos % (Auto)    (0.0-4.3)  %


 


Baso % (Auto)    (0.0-1.8)  %


 


Lymph # (Auto)    (1.2-5.4)  K/mm3


 


Mono # (Auto)    (0.0-0.8)  K/mm3


 


Eos # (Auto)    (0.0-0.4)  K/mm3


 


Baso # (Auto)    (0.0-0.1)  K/mm3


 


Seg Neutrophils %    (40.0-70.0)  %


 


Seg Neutrophils #    (1.8-7.7)  K/mm3


 


PT    (12.2-14.9)  Sec.


 


INR    (0.87-1.13)  


 


APTT    (24.2-36.6)  Sec.


 


Sodium    (137-145)  mmol/L


 


Potassium    (3.6-5.0)  mmol/L


 


Chloride    ()  mmol/L


 


Carbon Dioxide    (22-30)  mmol/L


 


Anion Gap    mmol/L


 


BUN    (7-17)  mg/dL


 


Creatinine    (0.6-1.2)  mg/dL


 


Estimated GFR    ml/min


 


BUN/Creatinine Ratio    %


 


Glucose    ()  mg/dL


 


Calcium    (8.4-10.2)  mg/dL


 


Magnesium  1.60 L   (1.7-2.3)  mg/dL


 


Total Bilirubin    (0.1-1.2)  mg/dL


 


Direct Bilirubin    (0-0.2)  mg/dL


 


Indirect Bilirubin    mg/dL


 


AST    (5-40)  units/L


 


ALT    (7-56)  units/L


 


Alkaline Phosphatase    ()  units/L


 


Troponin T  < 0.010   (0.00-0.029)  ng/mL


 


NT-Pro-B Natriuret Pep  331.2   (0-900)  pg/mL


 


Total Protein    (6.3-8.2)  g/dL


 


Albumin    (3.9-5)  g/dL


 


Albumin/Globulin Ratio    %


 


Lipase    (13-60)  units/L


 


Urine Color   Yellow  (Yellow)  


 


Urine Turbidity   Slightly-cloudy  (Clear)  


 


Urine pH   6.0  (5.0-7.0)  


 


Ur Specific Gravity   1.018  (1.003-1.030)  


 


Urine Protein   >500  (Negative)  mg/dL


 


Urine Glucose (UA)   Neg  (Negative)  mg/dL


 


Urine Ketones   Tr  (Negative)  mg/dL


 


Urine Blood   Neg  (Negative)  


 


Urine Nitrite   Neg  (Negative)  


 


Urine Bilirubin   Neg  (Negative)  


 


Urine Urobilinogen   < 2.0  (<2.0)  mg/dL


 


Ur Leukocyte Esterase   Sm  (Negative)  


 


Urine WBC (Auto)   < 1.0  (0.0-6.0)  /HPF


 


Urine RBC (Auto)   < 1.0  (0.0-6.0)  /HPF














- EKG Data


-: EKG Interpreted by Me





- EKG Data





02/04/22 23:45


Normal sinus rhythm.  Left axis deviation.  Normal intervals.  No ectopy.  

Inverted T waves noted in leads V1 through V3.  No significant ST segment or T 

wave abnormalities.





- Radiology Data


Radiology results: report reviewed





- Medical Decision Making





66-year-old female with recently diagnosed retroperitoneal mass of suspected 

malignant origin presenting complaining of nausea/vomiting and abdominal pain 

since last night.  Patient has had chronic abdominal pain but the nausea and 

vomiting developed last night.  She also reports diarrhea which is watery and 

black but admits to taking Pepto-Bismol.  She has no other complaints.  She is 

afebrile with normal vital signs.  On physical examination she is in mild 

distress secondary to pain.  She has dry mucous membranes.  Her abdomen is 

distended with tenderness to palpation of the left upper and lower quadrants 

without guarding or rebound.  She has left CVA tenderness.  We will perform 

broad work-up with a full set of labs, EKG, chest x-ray, and CT of the abdomen 

pelvis to assess for evidence of obstruction versus colitis versus 

diverticulitis versus pancreatitis versus other intra-abdominal abnormality to 

explain the patient's presentation.  We will give 1 L of IV fluids and 

Dilaudid/Zofran and reassess.





Labs have resulted and reveal no significant leukocytosis or anemia.  Creatinine

 is within normal range and there are no significant electrolyte abnormalities. 

 Troponin is negative.  BNP is normal.  Lipase is normal.  UA shows no evidence 

of infection.





Chest x-ray shows no acute abnormalities.





On repeat assessment, the patient is resting comfortably in the bed.  She 

reports dramatic improvement in her pain and nausea.





CT of the abdomen pelvis reveals previously seen retroperitoneal mass without 

acute findings or change.





On repeat assessment again, the patient remains with symptoms improved.  I 

discussed with the patient that she should follow-up with her primary care 

doctor over the next several days.  I instructed her to drink plenty of fluids 

and will prescribe Zofran for nausea.  I encouraged her to return to the 

emergency department should she develop significantly worsening symptoms, inabi

lity to tolerate food or liquid by mouth, or for any other new health concerns. 

 The patient expressed understanding and agreement with this plan of care


Critical care attestation.: 


If time is entered above; I have spent that time in minutes in the direct care 

of this critically ill patient, excluding procedure time.








ED Disposition


Clinical Impression: 


 Retroperitoneal mass, Nausea and vomiting, Left sided abdominal pain





Disposition: 01 HOME / SELF CARE / HOMELESS


Is pt being admited?: No


Instructions:  Nausea and Vomiting, Adult, Pain Without a Known Cause, Abdominal

 Pain (ED)


Additional Instructions: 


Please follow-up closely with your primary care doctor regarding pain 

management.  Return to the emergency department for any new health concerns.


Prescriptions: 


Ondansetron [Zofran Odt] 4 mg PO Q8HR PRN #20 tab.rapdis


 PRN Reason: Nausea And Vomiting


Referrals: 


[facility], [place] [Primary Care Provider] - 3-5 Days

## 2022-02-05 VITALS — DIASTOLIC BLOOD PRESSURE: 78 MMHG | SYSTOLIC BLOOD PRESSURE: 142 MMHG

## 2022-02-06 NOTE — ELECTROCARDIOGRAPH REPORT
[facility]

                                       

Test Date:    2022               Test Time:    19:41:52

Pat Name:     NEGRITA TELLES            Department:   

Patient ID:   [id number]          Room:          

Gender:       F                        Technician:   DANIEL

:          1955               Requested By: JASON THOMAS

Order Number: [id number]              Reading MD:   Adarsh Sin

                                 Measurements

Intervals                              Axis          

Rate:         71                       P:            57

LA:           151                      QRS:          -19

QRSD:         92                       T:            76

QT:           419                                    

QTc:          456                                    

                           Interpretive Statements

Sinus rhythm

Left ventricular hypertrophy

Abnormal T, consider ischemia, anterior leads

No previous ECG available for comparison

Electronically Signed On 2022 14:27:51 EST by Adarsh Sin

## 2022-02-11 ENCOUNTER — OFFICE VISIT (OUTPATIENT)
Dept: URBAN - METROPOLITAN AREA SURGERY CENTER 23 | Facility: SURGERY CENTER | Age: 67
End: 2022-02-11

## 2022-02-16 ENCOUNTER — OFFICE VISIT (OUTPATIENT)
Dept: URBAN - METROPOLITAN AREA SURGERY CENTER 23 | Facility: SURGERY CENTER | Age: 67
End: 2022-02-16

## 2022-03-10 PROBLEM — 428283002: Status: ACTIVE | Noted: 2021-12-27

## 2022-03-25 ENCOUNTER — OFFICE VISIT (OUTPATIENT)
Dept: URBAN - METROPOLITAN AREA SURGERY CENTER 23 | Facility: SURGERY CENTER | Age: 67
End: 2022-03-25